# Patient Record
Sex: FEMALE | Race: BLACK OR AFRICAN AMERICAN | NOT HISPANIC OR LATINO | Employment: FULL TIME | URBAN - NONMETROPOLITAN AREA
[De-identification: names, ages, dates, MRNs, and addresses within clinical notes are randomized per-mention and may not be internally consistent; named-entity substitution may affect disease eponyms.]

---

## 2023-03-03 PROBLEM — R79.89 ABNORMAL THYROID SCREEN (BLOOD): Status: ACTIVE | Noted: 2023-03-03

## 2023-03-03 PROBLEM — F41.9 ANXIETY: Status: ACTIVE | Noted: 2023-03-03

## 2023-03-03 PROBLEM — M79.661 BILATERAL CALF PAIN: Status: ACTIVE | Noted: 2023-03-03

## 2023-03-03 PROBLEM — H90.12 CONDUCTIVE HEARING LOSS OF LEFT EAR WITH UNRESTRICTED HEARING OF RIGHT EAR: Status: ACTIVE | Noted: 2023-03-03

## 2023-03-03 PROBLEM — R53.82 CHRONIC FATIGUE: Status: ACTIVE | Noted: 2023-03-03
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
## 2023-03-03 PROBLEM — M79.662 BILATERAL CALF PAIN: Status: ACTIVE | Noted: 2023-03-03

## 2023-03-03 PROBLEM — R19.5 LOOSE STOOLS: Status: ACTIVE | Noted: 2023-03-03

## 2023-03-03 PROBLEM — H69.92 DYSFUNCTION OF LEFT EUSTACHIAN TUBE: Status: ACTIVE | Noted: 2023-03-03

## 2023-03-03 PROBLEM — E03.9 ACQUIRED HYPOTHYROIDISM: Status: ACTIVE | Noted: 2023-03-03

## 2023-03-03 PROBLEM — H00.019 HORDEOLUM EXTERNUM: Status: ACTIVE | Noted: 2023-03-03

## 2023-03-03 PROBLEM — R00.0 TACHYCARDIA: Status: ACTIVE | Noted: 2023-03-03

## 2023-03-03 PROBLEM — R25.2 MUSCLE CRAMPS: Status: ACTIVE | Noted: 2023-03-03

## 2023-03-03 PROBLEM — F90.9 ADHD (ATTENTION DEFICIT HYPERACTIVITY DISORDER): Status: ACTIVE | Noted: 2023-03-03

## 2023-03-03 PROBLEM — N92.6 ABNORMAL MENSTRUAL CYCLE: Status: ACTIVE | Noted: 2023-03-03

## 2023-03-03 PROBLEM — E66.812 CLASS 2 SEVERE OBESITY DUE TO EXCESS CALORIES WITH SERIOUS COMORBIDITY AND BODY MASS INDEX (BMI) OF 39.0 TO 39.9 IN ADULT: Status: ACTIVE | Noted: 2023-03-03

## 2023-03-03 PROBLEM — N91.2 AMENORRHEA: Status: ACTIVE | Noted: 2023-03-03

## 2023-03-03 PROBLEM — E66.813 CLASS 3 SEVERE OBESITY DUE TO EXCESS CALORIES WITH SERIOUS COMORBIDITY AND BODY MASS INDEX (BMI) OF 40.0 TO 44.9 IN ADULT: Status: ACTIVE | Noted: 2023-03-03

## 2023-03-03 PROBLEM — R07.89 ATYPICAL CHEST PAIN: Status: ACTIVE | Noted: 2023-03-03

## 2023-03-03 PROBLEM — E66.01 CLASS 2 SEVERE OBESITY DUE TO EXCESS CALORIES WITH SERIOUS COMORBIDITY AND BODY MASS INDEX (BMI) OF 39.0 TO 39.9 IN ADULT (MULTI): Status: ACTIVE | Noted: 2023-03-03

## 2023-03-03 PROBLEM — M54.9 MID BACK PAIN: Status: ACTIVE | Noted: 2023-03-03

## 2023-03-03 PROBLEM — J30.9 ALLERGIC RHINITIS: Status: ACTIVE | Noted: 2023-03-03

## 2023-03-03 PROBLEM — E55.9 VITAMIN D DEFICIENCY: Status: ACTIVE | Noted: 2023-03-03

## 2023-03-03 PROBLEM — R00.2 HEART PALPITATIONS: Status: ACTIVE | Noted: 2023-03-03

## 2023-03-03 PROBLEM — H65.92 LEFT OTITIS MEDIA WITH EFFUSION: Status: ACTIVE | Noted: 2023-03-03

## 2023-03-03 PROBLEM — I83.93 ASYMPTOMATIC VARICOSE VEINS OF BOTH LOWER EXTREMITIES: Status: ACTIVE | Noted: 2023-03-03

## 2023-03-03 PROBLEM — R94.120 NEGATIVE PRESSURE OF LEFT MIDDLE EAR WITH TYPE C TYMPANOGRAM CURVE: Status: ACTIVE | Noted: 2023-03-03

## 2023-03-03 PROBLEM — R94.120 ABNORMAL OTOACOUSTIC EMISSIONS TEST: Status: ACTIVE | Noted: 2023-03-03

## 2023-03-03 PROBLEM — E04.9 THYROID ENLARGED: Status: ACTIVE | Noted: 2023-03-03

## 2023-03-03 PROBLEM — M25.562 LEFT KNEE PAIN: Status: ACTIVE | Noted: 2023-03-03

## 2023-03-03 PROBLEM — E66.01 CLASS 3 SEVERE OBESITY DUE TO EXCESS CALORIES WITH SERIOUS COMORBIDITY AND BODY MASS INDEX (BMI) OF 40.0 TO 44.9 IN ADULT (MULTI): Status: ACTIVE | Noted: 2023-03-03

## 2023-03-03 PROBLEM — E78.5 DYSLIPIDEMIA: Status: ACTIVE | Noted: 2023-03-03

## 2023-03-03 PROBLEM — H91.92 HEARING LOSS OF LEFT EAR: Status: ACTIVE | Noted: 2023-03-03

## 2023-03-03 PROBLEM — R79.89 ABNORMAL LIVER FUNCTION TEST: Status: ACTIVE | Noted: 2023-03-03

## 2023-03-03 RX ORDER — FLUTICASONE PROPIONATE 50 MCG
2 SPRAY, SUSPENSION (ML) NASAL 2 TIMES DAILY
COMMUNITY
Start: 2022-10-31

## 2023-03-03 RX ORDER — LEVOTHYROXINE SODIUM 75 UG/1
75 TABLET ORAL
COMMUNITY
End: 2023-03-14

## 2023-03-14 ENCOUNTER — OFFICE VISIT (OUTPATIENT)
Dept: PRIMARY CARE | Facility: CLINIC | Age: 23
End: 2023-03-14
Payer: COMMERCIAL

## 2023-03-14 VITALS
WEIGHT: 237.2 LBS | HEART RATE: 78 BPM | BODY MASS INDEX: 40.49 KG/M2 | HEIGHT: 64 IN | SYSTOLIC BLOOD PRESSURE: 122 MMHG | DIASTOLIC BLOOD PRESSURE: 81 MMHG | OXYGEN SATURATION: 98 % | RESPIRATION RATE: 18 BRPM

## 2023-03-14 DIAGNOSIS — E66.01 CLASS 3 SEVERE OBESITY DUE TO EXCESS CALORIES WITH SERIOUS COMORBIDITY AND BODY MASS INDEX (BMI) OF 40.0 TO 44.9 IN ADULT (MULTI): ICD-10-CM

## 2023-03-14 DIAGNOSIS — M79.662 BILATERAL CALF PAIN: ICD-10-CM

## 2023-03-14 DIAGNOSIS — M79.661 BILATERAL CALF PAIN: ICD-10-CM

## 2023-03-14 DIAGNOSIS — E03.9 ACQUIRED HYPOTHYROIDISM: Primary | ICD-10-CM

## 2023-03-14 DIAGNOSIS — E78.5 DYSLIPIDEMIA: ICD-10-CM

## 2023-03-14 DIAGNOSIS — E55.9 VITAMIN D DEFICIENCY: ICD-10-CM

## 2023-03-14 DIAGNOSIS — R79.89 ABNORMAL LIVER FUNCTION TEST: ICD-10-CM

## 2023-03-14 PROCEDURE — 3008F BODY MASS INDEX DOCD: CPT | Performed by: FAMILY MEDICINE

## 2023-03-14 PROCEDURE — 99214 OFFICE O/P EST MOD 30 MIN: CPT | Performed by: FAMILY MEDICINE

## 2023-03-14 PROCEDURE — 1036F TOBACCO NON-USER: CPT | Performed by: FAMILY MEDICINE

## 2023-03-14 RX ORDER — ERGOCALCIFEROL 1.25 MG/1
50000 CAPSULE ORAL
Qty: 4 CAPSULE | Refills: 2 | Status: SHIPPED | OUTPATIENT
Start: 2023-03-14 | End: 2023-06-06

## 2023-03-14 RX ORDER — LEVOTHYROXINE SODIUM 88 UG/1
88 TABLET ORAL DAILY
Qty: 30 TABLET | Refills: 1 | Status: SHIPPED | OUTPATIENT
Start: 2023-03-14 | End: 2023-05-11

## 2023-03-14 ASSESSMENT — PAIN SCALES - GENERAL: PAINLEVEL: 0-NO PAIN

## 2023-03-14 NOTE — LETTER
March 14, 2023     Patient: Alison Jones   YOB: 2000   Date of Visit: 3/14/2023       To Whom It May Concern:    Alison Jones was seen in my clinic on 3/14/2023 at 2:00 pm. Please excuse Alison for her absence from work on this day to make the appointment.    If you have any questions or concerns, please don't hesitate to call.         Sincerely,         Abhay Adams MD        CC: No Recipients

## 2023-03-14 NOTE — PROGRESS NOTES
"Subjective     Alison Jones is a 23 y.o. female who presents for No chief complaint on file..      HPI  The patient is a 23 year-old female presenting to the clinic for follow up on lab results. I have reviewed results from her most recent blood work with her.  Educated on hypothyroidism.  Reviewed lab results.  We will continue monitor.  Educated on obesity and diet and exercise.  Advised to lose weight.  Educated on dyslipidemia and diet and exercise.  Educated on vitamin D deficiency.  Educated on abnormal liver enzymes.  Reviewed venous Doppler results.  Calf pain improved.      Review of Systems  Review of systems  General.  Denies fever.  Denies chills.  HEENT denies nasal congestion.  Denies sinus pressure.  Respiratory.  Denies cough.  Denies shortness of breath.    Cardiovascular.  Denies chest pain.  Denies heart palpitations.  Denies shortness of breath.    Gastrointestinal.  Denies nausea vomiting diarrhea.  Denies abdominal pain.    Genitourinary denies burning urination.  Denies frequent urination.  Denies flank pain.  Denies blood in the urine.  Denies abnormal vaginal discharge.    Neurology.  Denies tingling numbness but denies weakness.  Denies headache.  Denies blurred vision.    Musculoskeletal.  Denies body aches.  Denies joint pains.  Denies muscle aches.  Denies muscle weakness    Endocrinology.  Denies cold intolerance.  Denies hot intolerance.    Psychiatric.  Denies depression.  Denies anxiety.  Denies suicidal.  Denies homicidal.        Objective       2/3/2021     4:08 PM 10/13/2021    11:24 AM 4/22/2022     3:03 PM 6/29/2022     4:39 PM 7/18/2022     1:39 PM 12/5/2022     3:55 PM 3/14/2023     2:11 PM   Vitals   Systolic 128 116 104 126 122 120 122   Diastolic 70 82 84 88 72 72 81   Heart Rate  78 86 88   78   Resp       18   Height (in)  1.626 m (5' 4\") 1.626 m (5' 4\") 1.626 m (5' 4\") 1.626 m (5' 4\") 1.626 m (5' 4\") 1.626 m (5' 4\")   Weight (lb)  224 230 234 235 237 237.2   BMI  " 38.45 kg/m2 39.48 kg/m2 40.17 kg/m2 40.34 kg/m2 40.68 kg/m2 40.72 kg/m2   BSA (m2)  2.15 m2 2.17 m2 2.19 m2 2.2 m2 2.21 m2 2.21 m2         Physical Exam  General.  Not in distress.  HEENT normocephalic anicteric sclerae.  Neck soft supple no thyromegaly.  No carotid bruit.  Lungs are clear.  Heart regular.  Abdomen soft nontender nondistended bowel sounds are positive.  Extremities no clubbing cyanosis or edema.  Psychiatric.  Has good eye contact.  No crying spells noted.  Speech was normal.  Denies depression.  Denies suicidal.  Denies homicidal.  Component      Latest Ref Rng 2/14/2023   WBC      4.4 - 11.3 x10E9/L 10.4    RBC      4.00 - 5.20 x10E12/L 4.54    HEMOGLOBIN      12.0 - 16.0 g/dL 13.7    HEMATOCRIT      36.0 - 46.0 % 40.5    MCV      80 - 100 fL 89    MCHC      32.0 - 36.0 g/dL 33.8    Platelets      150 - 450 x10E9/L 303    RED CELL DISTRIBUTION WIDTH      11.5 - 14.5 % 13.0    Neutrophils %      40.0 - 80.0 % 50.7    Immature Granulocytes %, Automated      0.0 - 0.9 % 0.3    Lymphocytes %      13.0 - 44.0 % 38.8    Monocytes %      2.0 - 10.0 % 8.5    Eosinophils %      0.0 - 6.0 % 1.2    Basophils %      0.0 - 2.0 % 0.5    Neutrophils Absolute      1.20 - 7.70 x10E9/L 5.28    Lymphocytes Absolute      1.20 - 4.80 x10E9/L 4.05    Monocytes Absolute      0.10 - 1.00 x10E9/L 0.89    Eosinophils Absolute      0.00 - 0.70 x10E9/L 0.13    Basophils Absolute      0.00 - 0.10 x10E9/L 0.05    GLUCOSE      74 - 99 mg/dL 58 (L)    SODIUM      136 - 145 mmol/L 140    POTASSIUM      3.5 - 5.3 mmol/L 4.0    CHLORIDE      98 - 107 mmol/L 102    Bicarbonate      21 - 32 mmol/L 28    Anion Gap      10 - 20 mmol/L 14    Blood Urea Nitrogen      6 - 23 mg/dL 11    Creatinine      0.50 - 1.05 mg/dL 0.66    GFR Female      >90 mL/min/1.73m2 >90    Calcium      8.6 - 10.3 mg/dL 9.9    Albumin      3.4 - 5.0 g/dL 4.4    Alkaline Phosphatase      33 - 110 U/L 106    Total Protein      6.4 - 8.2 g/dL 7.6    AST      9 -  39 U/L 35    Bilirubin Total      0.0 - 1.2 mg/dL 0.5    ALT      7 - 45 U/L 71 (H)    Color, Urine      STRAW,YELLOW  YELLOW    Appearance, Urine      CLEAR  HAZY    Specific Gravity, Urine      1.005 - 1.035  1.021    pH, Urine      5.0 - 8.0  5.0    Protein, Urine      NEGATIVE mg/dL NEGATIVE    Glucose, Urine      NEGATIVE mg/dL NEGATIVE    Blood, Urine      NEGATIVE  NEGATIVE    Ketones, Urine      NEGATIVE mg/dL NEGATIVE    Bilirubin, Urine      NEGATIVE  NEGATIVE    Urobilinogen, Urine      0.0 - 1.9 mg/dL <2.0    Nitrite, Urine      NEGATIVE  NEGATIVE    Leukocyte Esterase, Urine      NEGATIVE  LARGE(3+) !    CHOLESTEROL      0 - 199 mg/dL 200 (H)    HDL CHOLESTEROL      mg/dL 54.7    Cholesterol/HDL Ratio 3.7    LDL      0 - 119 mg/dL 129 (H)    VLDL      0 - 40 mg/dL 16    TRIGLYCERIDES      0 - 149 mg/dL 81    Non HDL Cholesterol      0 - 149 mg/dL 145    WBC, Urine      0 - 5 /HPF 19 !    RBC, Urine      0 - 5 /HPF 1    Squamous Epithelial Cells, Urine      /HPF 5    Mucus, Urine      /LPF FEW    Thyroid Stimulating Hormone      0.44 - 3.98 mIU/L 6.16 (H)    MAGNESIUM      1.60 - 2.40 mg/dL 1.82    Thyroxine, Free      0.61 - 1.12 ng/dL 0.95    Vitamin D, 25-Hydroxy, Total      ng/mL 18 !    Vitamin B12      211 - 911 pg/mL 666       Legend:  (L) Low  (H) High  ! Abnormal    Assessment/Plan     1.  Hypothyroidism.  Educated on hypothyroidism.  We will continue monitor.    2.  Obesity.  Educated on diet exercise.  Advised to lose weight.  We will continue monitor for    3.  Dyslipidemia.  Educated on diet and exercise.  Advised to lose weight we will continue monitor    4.  Vitamin D deficiency.  Educated on vitamin D deficiency.  We will continue monitor.    5.  Abnormal liver enzymes.  Dictated as above  6.  Bilateral calf pain.  Dictated as above.                                 Problem List Items Addressed This Visit          Digestive    Abnormal liver function test       Musculoskeletal    Bilateral  calf pain       Endocrine/Metabolic    Acquired hypothyroidism - Primary    Relevant Medications    Synthroid 88 mcg tablet    Other Relevant Orders    Tsh With Reflex To Free T4 If Abnormal    Vitamin D deficiency    Relevant Medications    ergocalciferol (Vitamin D-2) 1.25 MG (78670 UT) capsule    Other Relevant Orders    Vitamin D 25-Hydroxy,Total    Class 3 severe obesity due to excess calories with serious comorbidity and body mass index (BMI) of 40.0 to 44.9 in adult (CMS/[facility])       Other    Dyslipidemia         Scribe Attestation  By signing my name below, I, Twyla Villasenor   attest that this documentation has been prepared under the direction and in the presence of Abhay Adams MD.

## 2023-05-11 DIAGNOSIS — E03.9 ACQUIRED HYPOTHYROIDISM: ICD-10-CM

## 2023-05-11 RX ORDER — LEVOTHYROXINE SODIUM 88 UG/1
TABLET ORAL
Qty: 30 TABLET | Refills: 0 | Status: SHIPPED | OUTPATIENT
Start: 2023-05-11 | End: 2023-05-19 | Stop reason: SDUPTHER

## 2023-05-19 DIAGNOSIS — E03.9 ACQUIRED HYPOTHYROIDISM: ICD-10-CM

## 2023-05-19 RX ORDER — LEVOTHYROXINE SODIUM 88 UG/1
88 TABLET ORAL DAILY
Qty: 30 TABLET | Refills: 1 | Status: SHIPPED | OUTPATIENT
Start: 2023-05-19 | End: 2023-05-19 | Stop reason: SDUPTHER

## 2023-05-19 RX ORDER — LEVOTHYROXINE SODIUM 88 UG/1
88 TABLET ORAL DAILY
Qty: 30 TABLET | Refills: 0 | Status: SHIPPED | OUTPATIENT
Start: 2023-05-19 | End: 2023-10-30 | Stop reason: SDUPTHER

## 2023-08-15 DIAGNOSIS — E03.9 ACQUIRED HYPOTHYROIDISM: ICD-10-CM

## 2023-08-15 NOTE — TELEPHONE ENCOUNTER
Seeing you on 2-5-24, last labs in r/t this medication were from 2-14-23, TSH 6.16, T4 0.95, patient was encouraged to have a recent tsh drawn.

## 2023-08-17 DIAGNOSIS — E03.9 ACQUIRED HYPOTHYROIDISM: ICD-10-CM

## 2023-08-21 RX ORDER — LEVOTHYROXINE SODIUM 88 UG/1
88 TABLET ORAL DAILY
Qty: 90 TABLET | Refills: 0 | OUTPATIENT
Start: 2023-08-21 | End: 2023-11-19

## 2023-10-24 ENCOUNTER — LAB (OUTPATIENT)
Dept: LAB | Facility: LAB | Age: 23
End: 2023-10-24
Payer: COMMERCIAL

## 2023-10-24 DIAGNOSIS — E55.9 VITAMIN D DEFICIENCY: ICD-10-CM

## 2023-10-24 DIAGNOSIS — E03.9 ACQUIRED HYPOTHYROIDISM: ICD-10-CM

## 2023-10-24 LAB
T4 FREE SERPL-MCNC: 0.9 NG/DL (ref 0.61–1.12)
TSH SERPL-ACNC: 6.31 MIU/L (ref 0.44–3.98)

## 2023-10-24 PROCEDURE — 84443 ASSAY THYROID STIM HORMONE: CPT

## 2023-10-24 PROCEDURE — 84439 ASSAY OF FREE THYROXINE: CPT

## 2023-10-24 PROCEDURE — 36415 COLL VENOUS BLD VENIPUNCTURE: CPT

## 2023-10-24 PROCEDURE — 82306 VITAMIN D 25 HYDROXY: CPT

## 2023-10-25 LAB — 25(OH)D3 SERPL-MCNC: 32 NG/ML (ref 30–100)

## 2023-10-30 DIAGNOSIS — E03.9 ACQUIRED HYPOTHYROIDISM: ICD-10-CM

## 2023-10-31 RX ORDER — LEVOTHYROXINE SODIUM 88 UG/1
88 TABLET ORAL DAILY
Qty: 90 TABLET | Refills: 0 | Status: SHIPPED | OUTPATIENT
Start: 2023-10-31 | End: 2024-01-26

## 2024-01-26 DIAGNOSIS — E03.9 ACQUIRED HYPOTHYROIDISM: ICD-10-CM

## 2024-01-26 RX ORDER — LEVOTHYROXINE SODIUM 88 UG/1
88 TABLET ORAL DAILY
Qty: 30 TABLET | Refills: 0 | Status: SHIPPED | OUTPATIENT
Start: 2024-01-26 | End: 2024-02-26

## 2024-01-26 NOTE — TELEPHONE ENCOUNTER
Has apt. On 2/5 to establish care with you.  TSH 10/24/23 was 6.31, per your last note that patient was to increase her dose to 100mcg. I reached out to the patient to verify the dose she is taking and was unable to reach her at this time.

## 2024-02-01 PROBLEM — F41.9 ANXIETY AND DEPRESSION: Status: ACTIVE | Noted: 2021-01-15

## 2024-02-01 PROBLEM — F32.A ANXIETY AND DEPRESSION: Status: ACTIVE | Noted: 2021-01-15

## 2024-02-05 ENCOUNTER — OFFICE VISIT (OUTPATIENT)
Dept: PRIMARY CARE | Facility: CLINIC | Age: 24
End: 2024-02-05
Payer: COMMERCIAL

## 2024-02-05 VITALS
BODY MASS INDEX: 39.95 KG/M2 | RESPIRATION RATE: 16 BRPM | WEIGHT: 234 LBS | SYSTOLIC BLOOD PRESSURE: 130 MMHG | HEIGHT: 64 IN | OXYGEN SATURATION: 98 % | HEART RATE: 90 BPM | DIASTOLIC BLOOD PRESSURE: 85 MMHG

## 2024-02-05 DIAGNOSIS — E03.9 HYPOTHYROIDISM, UNSPECIFIED TYPE: ICD-10-CM

## 2024-02-05 DIAGNOSIS — Z00.00 HEALTHCARE MAINTENANCE: ICD-10-CM

## 2024-02-05 DIAGNOSIS — E78.5 DYSLIPIDEMIA: ICD-10-CM

## 2024-02-05 DIAGNOSIS — E04.1 THYROID NODULE: ICD-10-CM

## 2024-02-05 DIAGNOSIS — G47.30 SLEEP APNEA, UNSPECIFIED TYPE: ICD-10-CM

## 2024-02-05 PROCEDURE — 3008F BODY MASS INDEX DOCD: CPT | Performed by: INTERNAL MEDICINE

## 2024-02-05 PROCEDURE — 1036F TOBACCO NON-USER: CPT | Performed by: INTERNAL MEDICINE

## 2024-02-05 PROCEDURE — 99213 OFFICE O/P EST LOW 20 MIN: CPT | Performed by: INTERNAL MEDICINE

## 2024-02-05 ASSESSMENT — COLUMBIA-SUICIDE SEVERITY RATING SCALE - C-SSRS
1. IN THE PAST MONTH, HAVE YOU WISHED YOU WERE DEAD OR WISHED YOU COULD GO TO SLEEP AND NOT WAKE UP?: NO
6. HAVE YOU EVER DONE ANYTHING, STARTED TO DO ANYTHING, OR PREPARED TO DO ANYTHING TO END YOUR LIFE?: NO
2. HAVE YOU ACTUALLY HAD ANY THOUGHTS OF KILLING YOURSELF?: NO

## 2024-02-05 ASSESSMENT — PATIENT HEALTH QUESTIONNAIRE - PHQ9
2. FEELING DOWN, DEPRESSED OR HOPELESS: NOT AT ALL
1. LITTLE INTEREST OR PLEASURE IN DOING THINGS: NOT AT ALL
SUM OF ALL RESPONSES TO PHQ9 QUESTIONS 1 AND 2: 0

## 2024-02-05 ASSESSMENT — PAIN SCALES - GENERAL: PAINLEVEL: 0-NO PAIN

## 2024-02-05 NOTE — PROGRESS NOTES
"Patient ID: She states she was diagnosed with thyroid disease in 2019. She states she takes her medication as ordered. She states that she has been unable to sleep at night, tosses and turns all night long. She states her mom has thyroid disease, but her thyroid is hyperactive. She states her eyes sometimes get \"blotchy\" and she gets one migraine once per week, takes Ibuprofen which is effective.     HPI Alison Jones is a 24 y.o. female with PMH remarkable for obesity, hypothyroidism who presents to the office today for Establish Care and Hypothyroidism.    HEALTH MAINTENANCE: Establish Care  Previous PCP: Dr. Adams  Smoking: former smoker  Labs: 2/14/23    SOCIAL HISTORY:  Social History     Tobacco Use    Smoking status: Former     Packs/day: .25     Types: Cigarettes    Smokeless tobacco: Never   Vaping Use    Vaping Use: Never used   Substance Use Topics    Alcohol use: Never    Drug use: Yes     Types: Marijuana       IMMUNIZATIONS:  Immunization History   Administered Date(s) Administered    DTaP vaccine, pediatric  (INFANRIX) 2002, 2002, 04/23/2003, 02/21/2006, 04/01/2022    DTaP, Unspecified 2002, 2002, 2002, 04/23/2003    HPV, Unspecified 06/15/2011, 07/30/2014, 08/13/2015    Hep A, Unspecified 06/12/2009, 12/15/2009    Hep B, Unspecified 2002, 2002, 2002    HiB, unspecified 2002, 2002, 2002, 04/23/2003    Hib / Hep B 2002, 2002    Influenza Whole 12/07/2004    Influenza, seasonal, injectable 11/04/2011    MMR vaccine, subcutaneous (MMR II) 01/27/2003, 02/21/2006    Meningococcal B vaccine (BEXSERO) 09/10/2018    Meningococcal, Unknown Serogroups 02/22/2013, 09/10/2018, 09/10/2018    Novel influenza-H1N1-09, preservative-free 11/04/2009, 12/15/2009    Pneumococcal Conjugate PCV 7 2002, 2002, 04/23/2003    Pneumococcal, Unspecified 2002, 2002, 04/23/2003    Poliovirus vaccine, subcutaneous " (IPOL) 2002, 2002, 2002, 02/21/2006    Tdap vaccine, age 7 year and older (BOOSTRIX, ADACEL) 02/22/2013    Varicella vaccine, subcutaneous (VARIVAX) 01/27/2003, 06/15/2011       REVIEW OF SYSTEMS:  Review of Systems   Constitutional: Negative.    Respiratory: Negative.     Cardiovascular: Negative.    Gastrointestinal: Negative.    Genitourinary: Negative.    Musculoskeletal: Negative.    Neurological:  Positive for headaches.   Psychiatric/Behavioral:  Positive for sleep disturbance.        ALLERGIES:  Allergies   Allergen Reactions    Sulfa (Sulfonamide Antibiotics) Unknown        VITAL SIGNS:  Vitals:    02/05/24 1548   BP: 130/85   Pulse: 90   Resp: 16   SpO2: 98%       Physical Exam  Vitals reviewed.   Constitutional:       Appearance: Normal appearance.   HENT:      Head: Normocephalic and atraumatic.      Mouth/Throat:      Comments: + thyromegaly  Cardiovascular:      Rate and Rhythm: Normal rate and regular rhythm.      Pulses: Normal pulses.      Heart sounds: Normal heart sounds.   Pulmonary:      Effort: Pulmonary effort is normal.      Breath sounds: Normal breath sounds.   Abdominal:      General: Bowel sounds are normal.      Palpations: Abdomen is soft.   Musculoskeletal:         General: Normal range of motion.   Skin:     General: Skin is warm and dry.   Neurological:      General: No focal deficit present.      Mental Status: She is alert and oriented to person, place, and time.   Psychiatric:         Mood and Affect: Mood normal.         Behavior: Behavior normal.         MEDICATIONS:  Current Outpatient Medications on File Prior to Visit   Medication Sig Dispense Refill    levothyroxine (Synthroid, Levoxyl) 88 mcg tablet TAKE 1 TABLET BY MOUTH ONCE DAILY. 30 tablet 0    fluticasone (Flonase) 50 mcg/actuation nasal spray Administer 2 sprays into affected nostril(s) twice a day.       No current facility-administered medications on file prior to visit.        LABORATORY  DATA:  Lab Results   Component Value Date    WBC 10.4 02/14/2023    HGB 13.7 02/14/2023    HCT 40.5 02/14/2023     02/14/2023    CHOL 200 (H) 02/14/2023    TRIG 81 02/14/2023    HDL 54.7 02/14/2023    ALT 71 (H) 02/14/2023    AST 35 02/14/2023     02/14/2023    K 4.0 02/14/2023     02/14/2023    CREATININE 0.66 02/14/2023    BUN 11 02/14/2023    CO2 28 02/14/2023    TSH 6.31 (H) 10/24/2023       ASSESSMENT AND PLAN:  Assessment/Plan   Diagnoses and all orders for this visit:  Thyroid nodule  -     US thyroid; Future  Hypothyroidism, unspecified type  -     TSH; Future  -     T4, free; Future  -     c/w levothyroxine  Healthcare maintenance  -     Vitamin B12; Future  -     CBC and Auto Differential; Future  Dyslipidemia  -     Comprehensive metabolic panel; Future  -     Lipid panel; Future  Sleep apnea, unspecified type  -     In-Center Sleep Study (Non-Sleep Provider Only); Future    --------------------  Written by Brooklyn Okeefe LPN, acting as a scribe for Dr. Conde. This note accurately reflects the work and decisions made by Dr. Conde.     I, Dr. Conde, attest all medical record entries made by the scribe were under my direction and were personally dictated by me. I have reviewed the chart and agree that the record accurately reflects my performance of the history, physical exam, and assessment and plan.

## 2024-02-09 ASSESSMENT — ENCOUNTER SYMPTOMS
SLEEP DISTURBANCE: 1
RESPIRATORY NEGATIVE: 1
CARDIOVASCULAR NEGATIVE: 1
GASTROINTESTINAL NEGATIVE: 1
MUSCULOSKELETAL NEGATIVE: 1
CONSTITUTIONAL NEGATIVE: 1
HEADACHES: 1

## 2024-02-09 NOTE — PATIENT INSTRUCTIONS
It was great to see you in the office today! Here is what we discussed at your visit today:  Please get bloodwork drawn as soon as you are able. We will call you with results.  We have placed a referral for a sleep study. Schedule this as soon as you are able  Follow up in four months

## 2024-03-06 ENCOUNTER — LAB (OUTPATIENT)
Dept: LAB | Facility: LAB | Age: 24
End: 2024-03-06
Payer: COMMERCIAL

## 2024-03-06 DIAGNOSIS — E78.5 DYSLIPIDEMIA: ICD-10-CM

## 2024-03-06 DIAGNOSIS — Z00.00 HEALTHCARE MAINTENANCE: ICD-10-CM

## 2024-03-06 DIAGNOSIS — E03.9 HYPOTHYROIDISM, UNSPECIFIED TYPE: ICD-10-CM

## 2024-03-06 LAB
ALBUMIN SERPL BCP-MCNC: 4.2 G/DL (ref 3.4–5)
ALP SERPL-CCNC: 109 U/L (ref 33–110)
ALT SERPL W P-5'-P-CCNC: 89 U/L (ref 7–45)
ANION GAP SERPL CALC-SCNC: 11 MMOL/L (ref 10–20)
AST SERPL W P-5'-P-CCNC: 43 U/L (ref 9–39)
BASOPHILS # BLD AUTO: 0.05 X10*3/UL (ref 0–0.1)
BASOPHILS NFR BLD AUTO: 0.5 %
BILIRUB SERPL-MCNC: 0.4 MG/DL (ref 0–1.2)
BUN SERPL-MCNC: 9 MG/DL (ref 6–23)
CALCIUM SERPL-MCNC: 9.2 MG/DL (ref 8.6–10.3)
CHLORIDE SERPL-SCNC: 102 MMOL/L (ref 98–107)
CHOLEST SERPL-MCNC: 183 MG/DL (ref 0–199)
CHOLESTEROL/HDL RATIO: 3.6
CO2 SERPL-SCNC: 28 MMOL/L (ref 21–32)
CREAT SERPL-MCNC: 0.68 MG/DL (ref 0.5–1.05)
EGFRCR SERPLBLD CKD-EPI 2021: >90 ML/MIN/1.73M*2
EOSINOPHIL # BLD AUTO: 0.1 X10*3/UL (ref 0–0.7)
EOSINOPHIL NFR BLD AUTO: 0.9 %
ERYTHROCYTE [DISTWIDTH] IN BLOOD BY AUTOMATED COUNT: 12.6 % (ref 11.5–14.5)
GLUCOSE SERPL-MCNC: 73 MG/DL (ref 74–99)
HCT VFR BLD AUTO: 42.4 % (ref 36–46)
HDLC SERPL-MCNC: 51.5 MG/DL
HGB BLD-MCNC: 14 G/DL (ref 12–16)
IMM GRANULOCYTES # BLD AUTO: 0.03 X10*3/UL (ref 0–0.7)
IMM GRANULOCYTES NFR BLD AUTO: 0.3 % (ref 0–0.9)
LDLC SERPL CALC-MCNC: 113 MG/DL
LYMPHOCYTES # BLD AUTO: 4.42 X10*3/UL (ref 1.2–4.8)
LYMPHOCYTES NFR BLD AUTO: 41.8 %
MCH RBC QN AUTO: 30.3 PG (ref 26–34)
MCHC RBC AUTO-ENTMCNC: 33 G/DL (ref 32–36)
MCV RBC AUTO: 92 FL (ref 80–100)
MONOCYTES # BLD AUTO: 0.81 X10*3/UL (ref 0.1–1)
MONOCYTES NFR BLD AUTO: 7.7 %
NEUTROPHILS # BLD AUTO: 5.17 X10*3/UL (ref 1.2–7.7)
NEUTROPHILS NFR BLD AUTO: 48.8 %
NON HDL CHOLESTEROL: 132 MG/DL (ref 0–149)
NRBC BLD-RTO: 0 /100 WBCS (ref 0–0)
PLATELET # BLD AUTO: 309 X10*3/UL (ref 150–450)
POTASSIUM SERPL-SCNC: 3.9 MMOL/L (ref 3.5–5.3)
PROT SERPL-MCNC: 7.9 G/DL (ref 6.4–8.2)
RBC # BLD AUTO: 4.62 X10*6/UL (ref 4–5.2)
SODIUM SERPL-SCNC: 137 MMOL/L (ref 136–145)
T4 FREE SERPL-MCNC: 0.93 NG/DL (ref 0.61–1.12)
TRIGL SERPL-MCNC: 95 MG/DL (ref 0–149)
TSH SERPL-ACNC: 6.83 MIU/L (ref 0.44–3.98)
VLDL: 19 MG/DL (ref 0–40)
WBC # BLD AUTO: 10.6 X10*3/UL (ref 4.4–11.3)

## 2024-03-06 PROCEDURE — 36415 COLL VENOUS BLD VENIPUNCTURE: CPT

## 2024-03-06 PROCEDURE — 84443 ASSAY THYROID STIM HORMONE: CPT

## 2024-03-06 PROCEDURE — 85025 COMPLETE CBC W/AUTO DIFF WBC: CPT

## 2024-03-06 PROCEDURE — 82607 VITAMIN B-12: CPT

## 2024-03-06 PROCEDURE — 80061 LIPID PANEL: CPT

## 2024-03-06 PROCEDURE — 80053 COMPREHEN METABOLIC PANEL: CPT

## 2024-03-06 PROCEDURE — 84439 ASSAY OF FREE THYROXINE: CPT

## 2024-03-07 DIAGNOSIS — E03.9 ACQUIRED HYPOTHYROIDISM: Primary | ICD-10-CM

## 2024-03-07 LAB — VIT B12 SERPL-MCNC: 625 PG/ML (ref 211–911)

## 2024-03-07 RX ORDER — LEVOTHYROXINE SODIUM 100 UG/1
100 TABLET ORAL DAILY
Qty: 90 TABLET | Refills: 0 | Status: SHIPPED | OUTPATIENT
Start: 2024-03-07 | End: 2024-06-03

## 2024-03-14 ENCOUNTER — HOSPITAL ENCOUNTER (OUTPATIENT)
Dept: RADIOLOGY | Facility: HOSPITAL | Age: 24
Discharge: HOME | End: 2024-03-14
Payer: COMMERCIAL

## 2024-03-14 DIAGNOSIS — E04.1 THYROID NODULE: ICD-10-CM

## 2024-03-14 PROCEDURE — 76536 US EXAM OF HEAD AND NECK: CPT

## 2024-03-18 ENCOUNTER — APPOINTMENT (OUTPATIENT)
Dept: SLEEP MEDICINE | Facility: CLINIC | Age: 24
End: 2024-03-18
Payer: COMMERCIAL

## 2024-03-18 DIAGNOSIS — G43.909 MIGRAINE WITHOUT STATUS MIGRAINOSUS, NOT INTRACTABLE, UNSPECIFIED MIGRAINE TYPE: Primary | ICD-10-CM

## 2024-03-18 RX ORDER — SUMATRIPTAN 50 MG/1
50 TABLET, FILM COATED ORAL ONCE AS NEEDED
Qty: 5 TABLET | Refills: 2 | Status: SHIPPED | OUTPATIENT
Start: 2024-03-18 | End: 2025-03-18

## 2024-03-22 PROBLEM — R42 LIGHTHEADEDNESS: Status: ACTIVE | Noted: 2024-03-22

## 2024-03-22 PROBLEM — E66.3 PEDIATRIC OVERWEIGHT: Status: ACTIVE | Noted: 2024-03-22

## 2024-03-22 PROBLEM — R07.0 PAIN IN THROAT: Status: ACTIVE | Noted: 2024-03-22

## 2024-03-22 PROBLEM — J20.9 ACUTE BRONCHITIS: Status: ACTIVE | Noted: 2024-03-22

## 2024-03-22 PROBLEM — G47.30 SLEEP APNEA: Status: ACTIVE | Noted: 2024-03-22

## 2024-03-22 PROBLEM — J02.9 EXUDATIVE PHARYNGITIS: Status: ACTIVE | Noted: 2024-03-22

## 2024-03-25 ENCOUNTER — APPOINTMENT (OUTPATIENT)
Dept: PRIMARY CARE | Facility: CLINIC | Age: 24
End: 2024-03-25
Payer: COMMERCIAL

## 2024-06-03 DIAGNOSIS — E03.9 ACQUIRED HYPOTHYROIDISM: ICD-10-CM

## 2024-06-03 RX ORDER — LEVOTHYROXINE SODIUM 100 UG/1
100 TABLET ORAL DAILY
Qty: 90 TABLET | Refills: 0 | Status: SHIPPED | OUTPATIENT
Start: 2024-06-03

## 2024-08-19 ENCOUNTER — APPOINTMENT (OUTPATIENT)
Dept: PRIMARY CARE | Facility: CLINIC | Age: 24
End: 2024-08-19
Payer: COMMERCIAL

## 2024-09-01 DIAGNOSIS — E03.9 ACQUIRED HYPOTHYROIDISM: ICD-10-CM

## 2024-09-03 RX ORDER — LEVOTHYROXINE SODIUM 100 UG/1
100 TABLET ORAL DAILY
Qty: 90 TABLET | Refills: 0 | Status: SHIPPED | OUTPATIENT
Start: 2024-09-03

## 2024-09-09 ENCOUNTER — APPOINTMENT (OUTPATIENT)
Dept: PRIMARY CARE | Facility: CLINIC | Age: 24
End: 2024-09-09
Payer: COMMERCIAL

## 2024-12-12 DIAGNOSIS — E03.9 ACQUIRED HYPOTHYROIDISM: ICD-10-CM

## 2024-12-12 RX ORDER — LEVOTHYROXINE SODIUM 100 UG/1
100 TABLET ORAL DAILY
Qty: 90 TABLET | Refills: 0 | Status: SHIPPED | OUTPATIENT
Start: 2024-12-12

## 2025-04-18 DIAGNOSIS — E03.9 ACQUIRED HYPOTHYROIDISM: ICD-10-CM

## 2025-04-21 RX ORDER — LEVOTHYROXINE SODIUM 100 UG/1
100 TABLET ORAL DAILY
Qty: 15 TABLET | Refills: 0 | Status: SHIPPED | OUTPATIENT
Start: 2025-04-21

## 2025-05-06 DIAGNOSIS — E03.9 ACQUIRED HYPOTHYROIDISM: ICD-10-CM

## 2025-05-06 RX ORDER — LEVOTHYROXINE SODIUM 100 UG/1
100 TABLET ORAL DAILY
Qty: 30 TABLET | Refills: 0 | Status: SHIPPED | OUTPATIENT
Start: 2025-05-06

## 2025-05-22 ENCOUNTER — APPOINTMENT (OUTPATIENT)
Dept: PRIMARY CARE | Facility: CLINIC | Age: 25
End: 2025-05-22
Payer: COMMERCIAL

## 2025-06-19 DIAGNOSIS — E03.9 ACQUIRED HYPOTHYROIDISM: ICD-10-CM

## 2025-06-19 RX ORDER — LEVOTHYROXINE SODIUM 100 UG/1
100 TABLET ORAL DAILY
Qty: 90 TABLET | Refills: 1 | Status: SHIPPED | OUTPATIENT
Start: 2025-06-19